# Patient Record
Sex: MALE | Race: WHITE | NOT HISPANIC OR LATINO | Employment: STUDENT | URBAN - METROPOLITAN AREA
[De-identification: names, ages, dates, MRNs, and addresses within clinical notes are randomized per-mention and may not be internally consistent; named-entity substitution may affect disease eponyms.]

---

## 2021-12-22 ENCOUNTER — OFFICE VISIT (OUTPATIENT)
Dept: SPORTS MEDICINE | Facility: CLINIC | Age: 21
End: 2021-12-22

## 2021-12-22 VITALS
HEART RATE: 61 BPM | HEIGHT: 77 IN | BODY MASS INDEX: 20.31 KG/M2 | SYSTOLIC BLOOD PRESSURE: 118 MMHG | DIASTOLIC BLOOD PRESSURE: 70 MMHG | TEMPERATURE: 98 F | WEIGHT: 172 LBS | OXYGEN SATURATION: 98 %

## 2021-12-22 DIAGNOSIS — M48.02 FORAMINAL STENOSIS OF CERVICAL REGION: ICD-10-CM

## 2021-12-22 DIAGNOSIS — M50.30 DDD (DEGENERATIVE DISC DISEASE), CERVICAL: Primary | ICD-10-CM

## 2021-12-22 PROCEDURE — 99204 OFFICE O/P NEW MOD 45 MIN: CPT | Performed by: FAMILY MEDICINE

## 2021-12-22 NOTE — PROGRESS NOTES
Chief Complaint  Neck Pain ([facility]; states that it is a chronic issue that he would like to get a second opinion. pt states that it is also affecting his upper back. )    Subjective          Alvarado Gipson presents to [facility]  History of Present Illness  Patient is here with his father today.  Patient is complaining of left lower cervical spine pain with occasional radiation into the left shoulder.  Patient was last seen here 12/7/2016 for upper back pain.  Patient had MRI thoracic spine at that time which was normal except for mention of a possible bilateral pars defect at L5-S1.  Patient was treated with muscle relaxant and physical therapy.  Father states that the patient actually has continued to have pain since that time, nothing is really seem to be very helpful then has progressed to the point that is fairly constant pain.  8/30/2021 patient saw neurosurgery at [facility] for chronic neck pain and mid line thoracic spine pain-I have reviewed that note.  On 10/5/2021 patient had MRI of cervical and thoracic spine.  I have reviewed those results and the thoracic spine shows multilevel Schmorl's node and disc desiccation greatest at T6-T7 with multiple levels of mild disc bulges which contribute to some cord deformity at T4-T5 and T7-T8 without cord signal abnormality no spinal canal or neuroforaminal stenosis was noted.  On his cervical spine he was noted to have multilevel degenerative changes greatest at C3 and C4 with some mild spinal canal stenosis with a disc osteophyte complex, also had a moderate neuroforaminal stenosis at left C6-C7 due to disc osteophyte complex.  It was suggested that patient see pain management which he did.  Pain management offered epidural steroid injection but suggested he follow-up here to make sure there is not an underlying reason as to why 21-year-old would have the degree of degenerative disc changes that he has.  Patient's father would also  "like to know if he should undergo the epidural injection or get a second opinion from another spinal specialist.  Objective   Vital Signs:   /70   Pulse 61   Temp 98 °F (36.7 °C) (Temporal)   Ht 195.6 cm (77.01\")   Wt 78 kg (172 lb)   SpO2 98%   BMI 20.39 kg/m²     Physical Exam  Vitals reviewed.   Constitutional:       Appearance: He is well-developed.   HENT:      Head: Normocephalic and atraumatic.   Eyes:      Conjunctiva/sclera: Conjunctivae normal.      Pupils: Pupils are equal, round, and reactive to light.   Cardiovascular:      Comments: No peripheral edema  Pulmonary:      Effort: Pulmonary effort is normal.   Musculoskeletal:      Comments: He does have some tightness of the left trapezius group with a equivocal Spurling to the left.  Motor 5 out of 5.   Skin:     General: Skin is warm and dry.   Neurological:      Mental Status: He is alert and oriented to person, place, and time.   Psychiatric:         Behavior: Behavior normal.        Result Review :                MRI Thoracic Spine Without Contrast (12/07/2016)    Assessment and Plan    Diagnoses and all orders for this visit:    1. DDD (degenerative disc disease), cervical (Primary)  -     DEJON With / DsDNA, RNP, Sjogrens A / B, Smith  -     Sedimentation Rate  -     C-reactive Protein  -     HLA-B27 Antigen  -     Rheumatoid Arthritis (RA) Profile  -     DEXA Bone Density Axial; Future    2. Foraminal stenosis of cervical region  -     DEJON With / DsDNA, RNP, Sjogrens A / B, Smith  -     Sedimentation Rate  -     C-reactive Protein  -     HLA-B27 Antigen  -     Rheumatoid Arthritis (RA) Profile  -     DEXA Bone Density Axial; Future        Discussed with the patient and his father that it would be a good idea to look for any sort of autoimmune disorder that could cause axial spine degeneration to the degree that he has and also a bone density scan.  I would also suggest trying the epidural injections but if they give no relief or the " relief is not long lasting more than a few weeks then will consider referring to spinal specialist for second opinion.    Follow Up   No follow-ups on file.  Patient was given instructions and counseling regarding his condition or for health maintenance advice. Please see specific information pulled into the AVS if appropriate.

## 2021-12-23 ENCOUNTER — PATIENT ROUNDING (BHMG ONLY) (OUTPATIENT)
Dept: SPORTS MEDICINE | Facility: CLINIC | Age: 21
End: 2021-12-23

## 2021-12-30 LAB
ANA SER QL: NEGATIVE
CCP IGA+IGG SERPL IA-ACNC: 7 UNITS (ref 0–19)
CRP SERPL-MCNC: <1 MG/L (ref 0–10)
ERYTHROCYTE [SEDIMENTATION RATE] IN BLOOD BY WESTERGREN METHOD: 2 MM/HR (ref 0–15)
HLA-B27 QL NAA+PROBE: NEGATIVE
RHEUMATOID FACT SERPL-ACNC: <10 IU/ML